# Patient Record
Sex: MALE | Race: BLACK OR AFRICAN AMERICAN | NOT HISPANIC OR LATINO | Employment: FULL TIME | URBAN - METROPOLITAN AREA
[De-identification: names, ages, dates, MRNs, and addresses within clinical notes are randomized per-mention and may not be internally consistent; named-entity substitution may affect disease eponyms.]

---

## 2017-03-29 ENCOUNTER — HOSPITAL ENCOUNTER (EMERGENCY)
Facility: HOSPITAL | Age: 37
Discharge: HOME | End: 2017-03-29
Attending: EMERGENCY MEDICINE

## 2017-03-29 VITALS
OXYGEN SATURATION: 98 % | SYSTOLIC BLOOD PRESSURE: 130 MMHG | DIASTOLIC BLOOD PRESSURE: 70 MMHG | RESPIRATION RATE: 20 BRPM | TEMPERATURE: 98 F | HEART RATE: 74 BPM | HEIGHT: 69 IN | WEIGHT: 245 LBS | BODY MASS INDEX: 36.29 KG/M2

## 2017-03-29 DIAGNOSIS — R05.9 COUGH: ICD-10-CM

## 2017-03-29 DIAGNOSIS — B34.9 VIRAL SYNDROME: ICD-10-CM

## 2017-03-29 DIAGNOSIS — K52.9 ENTERITIS: Primary | ICD-10-CM

## 2017-03-29 LAB
ALBUMIN SERPL BCP-MCNC: 3.7 G/DL
ALP SERPL-CCNC: 99 U/L
ALT SERPL W/O P-5'-P-CCNC: 80 U/L
ANION GAP SERPL CALC-SCNC: 9 MMOL/L
AST SERPL-CCNC: 25 U/L
BACTERIA #/AREA URNS HPF: ABNORMAL /HPF
BASOPHILS # BLD AUTO: 0.04 K/UL
BASOPHILS NFR BLD: 0.3 %
BILIRUB SERPL-MCNC: 0.8 MG/DL
BILIRUB UR QL STRIP: NEGATIVE
BUN SERPL-MCNC: 7 MG/DL
CALCIUM SERPL-MCNC: 9.3 MG/DL
CHLORIDE SERPL-SCNC: 103 MMOL/L
CLARITY UR: CLEAR
CO2 SERPL-SCNC: 25 MMOL/L
COLOR UR: ABNORMAL
CREAT SERPL-MCNC: 0.9 MG/DL
DIFFERENTIAL METHOD: ABNORMAL
EOSINOPHIL # BLD AUTO: 0.1 K/UL
EOSINOPHIL NFR BLD: 0.4 %
ERYTHROCYTE [DISTWIDTH] IN BLOOD BY AUTOMATED COUNT: 13.8 %
EST. GFR  (AFRICAN AMERICAN): >60 ML/MIN/1.73 M^2
EST. GFR  (NON AFRICAN AMERICAN): >60 ML/MIN/1.73 M^2
GLUCOSE SERPL-MCNC: 91 MG/DL
GLUCOSE UR QL STRIP: NEGATIVE
HCT VFR BLD AUTO: 43.1 %
HGB BLD-MCNC: 14.7 G/DL
HGB UR QL STRIP: NEGATIVE
HYALINE CASTS #/AREA URNS LPF: 0 /LPF
KETONES UR QL STRIP: ABNORMAL
LEUKOCYTE ESTERASE UR QL STRIP: NEGATIVE
LIPASE SERPL-CCNC: 4 U/L
LYMPHOCYTES # BLD AUTO: 2.8 K/UL
LYMPHOCYTES NFR BLD: 23.3 %
MCH RBC QN AUTO: 30.3 PG
MCHC RBC AUTO-ENTMCNC: 34.1 %
MCV RBC AUTO: 89 FL
MICROSCOPIC COMMENT: ABNORMAL
MONOCYTES # BLD AUTO: 1 K/UL
MONOCYTES NFR BLD: 8 %
NEUTROPHILS # BLD AUTO: 8 K/UL
NEUTROPHILS NFR BLD: 68 %
NITRITE UR QL STRIP: NEGATIVE
PH UR STRIP: 5 [PH] (ref 5–8)
PLATELET # BLD AUTO: 303 K/UL
PMV BLD AUTO: 10 FL
POTASSIUM SERPL-SCNC: 3.6 MMOL/L
PROT SERPL-MCNC: 8.4 G/DL
PROT UR QL STRIP: ABNORMAL
RBC # BLD AUTO: 4.85 M/UL
RBC #/AREA URNS HPF: 0 /HPF (ref 0–4)
SODIUM SERPL-SCNC: 137 MMOL/L
SP GR UR STRIP: >1.03 (ref 1–1.03)
SQUAMOUS #/AREA URNS HPF: 1 /HPF
URN SPEC COLLECT METH UR: ABNORMAL
UROBILINOGEN UR STRIP-ACNC: ABNORMAL EU/DL
WBC # BLD AUTO: 11.82 K/UL
WBC #/AREA URNS HPF: 0 /HPF (ref 0–5)

## 2017-03-29 PROCEDURE — 25000003 PHARM REV CODE 250: Performed by: EMERGENCY MEDICINE

## 2017-03-29 PROCEDURE — 96375 TX/PRO/DX INJ NEW DRUG ADDON: CPT

## 2017-03-29 PROCEDURE — 81000 URINALYSIS NONAUTO W/SCOPE: CPT

## 2017-03-29 PROCEDURE — 85025 COMPLETE CBC W/AUTO DIFF WBC: CPT

## 2017-03-29 PROCEDURE — 96361 HYDRATE IV INFUSION ADD-ON: CPT

## 2017-03-29 PROCEDURE — 63600175 PHARM REV CODE 636 W HCPCS: Performed by: EMERGENCY MEDICINE

## 2017-03-29 PROCEDURE — 80053 COMPREHEN METABOLIC PANEL: CPT

## 2017-03-29 PROCEDURE — 96374 THER/PROPH/DIAG INJ IV PUSH: CPT

## 2017-03-29 PROCEDURE — 83690 ASSAY OF LIPASE: CPT

## 2017-03-29 PROCEDURE — 99284 EMERGENCY DEPT VISIT MOD MDM: CPT | Mod: 25

## 2017-03-29 RX ORDER — ONDANSETRON 4 MG/1
4 TABLET, FILM COATED ORAL EVERY 8 HOURS PRN
Qty: 12 TABLET | Refills: 0 | Status: SHIPPED | OUTPATIENT
Start: 2017-03-29

## 2017-03-29 RX ORDER — HYDROMORPHONE HYDROCHLORIDE 2 MG/ML
1 INJECTION, SOLUTION INTRAMUSCULAR; INTRAVENOUS; SUBCUTANEOUS
Status: COMPLETED | OUTPATIENT
Start: 2017-03-29 | End: 2017-03-29

## 2017-03-29 RX ORDER — ONDANSETRON 2 MG/ML
4 INJECTION INTRAMUSCULAR; INTRAVENOUS
Status: COMPLETED | OUTPATIENT
Start: 2017-03-29 | End: 2017-03-29

## 2017-03-29 RX ADMIN — SODIUM CHLORIDE 1000 ML: 0.9 INJECTION, SOLUTION INTRAVENOUS at 12:03

## 2017-03-29 RX ADMIN — HYDROMORPHONE HYDROCHLORIDE 1 MG: 2 INJECTION INTRAMUSCULAR; INTRAVENOUS; SUBCUTANEOUS at 12:03

## 2017-03-29 RX ADMIN — ONDANSETRON 4 MG: 2 INJECTION INTRAMUSCULAR; INTRAVENOUS at 12:03

## 2017-03-29 NOTE — ED AVS SNAPSHOT
[facility]  [address] [place] [address]               Berry Fierro   3/29/2017 11:32 AM   ED    Description:  Male : 1980   Department:  [facility]           Your Care was Coordinated By:     Provider Role From To    Juan Antonio Woodall MD Attending Provider 17 1134 --      Reason for Visit     Vomiting           Diagnoses this Visit        Comments    Enteritis    -  Primary     Cough         Viral syndrome           ED Disposition     ED Disposition Condition Comment    Discharge             To Do List           Follow-up Information     Follow up with Sofiya-Rachel Gallagher MD. Schedule an appointment as soon as possible for a visit in 1 week.    Specialty:  Family Medicine    Why:  As needed    Contact information:    [address] [place] [address]  [phone]         These Medications        Disp Refills Start End    ondansetron (ZOFRAN) 4 MG tablet 12 tablet 0 3/29/2017     Take 1 tablet (4 mg total) by mouth every 8 (eight) hours as needed. - Oral      [facility]     [facility] -  Assistance  Registered nurses in the [facility] provide clinical advisement, health education, appointment booking, and other advisory services.  Call for this free service at [phone].             Medications           Message regarding Medications     Verify the changes and/or additions to your medication regime listed below are the same as discussed with your clinician today.  If any of these changes or additions are incorrect, please notify your healthcare provider.        START taking these NEW medications        Refills    ondansetron (ZOFRAN) 4 MG tablet 0    Sig: Take 1 tablet (4 mg total) by mouth every 8 (eight) hours as needed.    Class: Print    Route: Oral      These medications were administered today        Dose Freq    sodium chloride 0.9% bolus 1,000 mL 1,000 mL Once    Sig: Inject 1,000 mLs  "into the vein once.    Class: Normal    Route: Intravenous    hydromorphone (PF) injection 1 mg 1 mg ED 1 Time    Sig: Inject 0.5 mLs (1 mg total) into the vein ED 1 Time.    Class: Normal    Route: Intravenous    ondansetron injection 4 mg 4 mg ED 1 Time    Sig: Inject 4 mg into the vein ED 1 Time.    Class: Normal    Route: Intravenous           Verify that the below list of medications is an accurate representation of the medications you are currently taking.  If none reported, the list may be blank. If incorrect, please contact your healthcare provider. Carry this list with you in case of emergency.           Current Medications     ondansetron (ZOFRAN) 4 MG tablet Take 1 tablet (4 mg total) by mouth every 8 (eight) hours as needed.           Clinical Reference Information           Your Vitals Were     BP Pulse Temp Resp Height Weight    124/72 (BP Location: Right arm, Patient Position: Sitting) 70 98.2 °F (36.8 °C) (Oral) 16 5' 9" (1.753 m) 111.1 kg (245 lb)    SpO2 BMI             97% 36.18 kg/m2         Allergies as of 3/29/2017     No Known Allergies      Immunizations Administered on Date of Encounter - 3/29/2017     None      ED Micro, Lab, POCT     Start Ordered       Status Ordering Provider    03/29/17 1239 03/29/17 1239  Urinalysis  STAT      Final result     03/29/17 1239 03/29/17 1239  Urinalysis Microscopic  Once      Final result     03/29/17 1138 03/29/17 1138  CBC W/ AUTO DIFFERENTIAL  Once      Final result     03/29/17 1138 03/29/17 1138  Comp. Metabolic Panel  STAT      Final result     03/29/17 1138 03/29/17 1138  Lipase  STAT      Final result       ED Imaging Orders     Start Ordered       Status Ordering Provider    03/29/17 1138 03/29/17 1138  X-ray chest PA and lateral  1 time imaging     Comments:  Cough    Final result         Discharge Instructions       VIRAL SYNDROME [Adult]   A viral illness may cause a number of symptoms. The symptoms depend on the part of the body that the virus " "affects. If it settles in the nose/throat/lungs, it may cause cough, sore throat, congestion and sometimes headache. If it settles in the stomach and intestinal tract, it may cause vomiting and diarrhea. Sometimes, it causes vague symptoms like "aching all over," feeling tired, loss of appetite, or fever.   A viral illness usually lasts from 1-2 weeks and sometimes longer. In some cases, a more serious infection can look like a viral syndrome in the first few days of the illness. Repeat exams and further tests are needed to know the difference. Therefore, it is important to watch for the warning signs listed below.   HOME CARE:   If symptoms are severe, rest at home for the first 2-3 days.   Stay away from cigarette smoke (yours or other peoples).   You may use Tylenol (acetaminophen) or ibuprofen (Motrin, Advil) for fever, muscle aching and headache, unless another medicine was prescribed for this. [ NOTE : If you have chronic liver or kidney disease or ever had a stomach ulcer or GI bleeding, talk with your doctor before using these medicines.] (Aspirin should never be used in anyone under 18 years of age who is ill with a fever. It may cause severe liver damage.)   Your appetite may be poor, so a light diet is fine. Avoid dehydration by drinking 8-12 eight-ounce glasses of fluids per day. This may include water, orange juice and lemonade, apple, grape and cranberry juice, clear fruit drinks, electrolyte replacement and sports drinks, decaffeinated teas and coffee.   Over-the-counter remedies will not shorten the length of the illness but may be helpful for the following symptoms: cough (Robitussin DM); sore throat (Chloraseptic lozenges or spray); nasal and sinus congestion (Actifed or Sudafed). [NOTE: Do not use decongestants if you have high blood pressure.].  FOLLOW UP with your doctor as advised if you do not improve over the next week.   GET PROMPT MEDICAL ATTENTION if any of the following occur:   Cough " with lots of colored sputum (mucus) or blood in your sputum   Chest pain, shortness of breath, wheezing or difficulty breathing   Severe headache; face, neck or ear pain   Severe constant right-sided lower abdominal pain   Continued vomiting (cant keep liquids down)   Frequent diarrhea (more than 5 times a day); blood (red or black color) or mucus in diarrhea   Feeling weak, dizzy, or like you are going to faint   Extreme thirst   Fever of 100.4°F (38°C) oral or higher, not better with fever medication   [employer]  © 8883-4847 [employer] [facility], [address] [place] [address], [place] [address]. All rights reserved. This information is not intended as a substitute for professional medical care. Always follow your healthcare professional's instructions.      Smoking Cessation     If you would like to quit smoking:   You may be eligible for free services if you are a [place] resident and started smoking cigarettes before September 1, 1988.  Call the [employer]oking [employer]essation [employer] ([employer]) toll free at (372) 656-9592 or (983) 886-3806.   Call 1-800-QUIT-NOW if you do not meet the above criteria.             [facility] complies with applicable Federal civil rights laws and does not discriminate on the basis of race, color, national origin, age, disability, or sex.        Language Assistance Services     ATTENTION: Language assistance services are available, free of charge. Please call [phone].      ATENCIÓN: Si habla español, tiene a rivas disposición servicios gratuitos de asistencia lingüística. Llame al [phone].     CHÚ Ý: N?u b?n nói Ti?ng Vi?t, có các d?ch v? h? tr? ngôn ng? mi?n phí dành cho b?n. G?i s? [phone].

## 2017-03-29 NOTE — DISCHARGE INSTRUCTIONS
"VIRAL SYNDROME [Adult]   A viral illness may cause a number of symptoms. The symptoms depend on the part of the body that the virus affects. If it settles in the nose/throat/lungs, it may cause cough, sore throat, congestion and sometimes headache. If it settles in the stomach and intestinal tract, it may cause vomiting and diarrhea. Sometimes, it causes vague symptoms like "aching all over," feeling tired, loss of appetite, or fever.   A viral illness usually lasts from 1-2 weeks and sometimes longer. In some cases, a more serious infection can look like a viral syndrome in the first few days of the illness. Repeat exams and further tests are needed to know the difference. Therefore, it is important to watch for the warning signs listed below.   HOME CARE:   If symptoms are severe, rest at home for the first 2-3 days.   Stay away from cigarette smoke (yours or other peoples).   You may use Tylenol (acetaminophen) or ibuprofen (Motrin, Advil) for fever, muscle aching and headache, unless another medicine was prescribed for this. [ NOTE : If you have chronic liver or kidney disease or ever had a stomach ulcer or GI bleeding, talk with your doctor before using these medicines.] (Aspirin should never be used in anyone under 18 years of age who is ill with a fever. It may cause severe liver damage.)   Your appetite may be poor, so a light diet is fine. Avoid dehydration by drinking 8-12 eight-ounce glasses of fluids per day. This may include water, orange juice and lemonade, apple, grape and cranberry juice, clear fruit drinks, electrolyte replacement and sports drinks, decaffeinated teas and coffee.   Over-the-counter remedies will not shorten the length of the illness but may be helpful for the following symptoms: cough (Robitussin DM); sore throat (Chloraseptic lozenges or spray); nasal and sinus congestion (Actifed or Sudafed). [NOTE: Do not use decongestants if you have high blood pressure.].  FOLLOW UP with your " doctor as advised if you do not improve over the next week.   GET PROMPT MEDICAL ATTENTION if any of the following occur:   Cough with lots of colored sputum (mucus) or blood in your sputum   Chest pain, shortness of breath, wheezing or difficulty breathing   Severe headache; face, neck or ear pain   Severe constant right-sided lower abdominal pain   Continued vomiting (cant keep liquids down)   Frequent diarrhea (more than 5 times a day); blood (red or black color) or mucus in diarrhea   Feeling weak, dizzy, or like you are going to faint   Extreme thirst   Fever of 100.4°F (38°C) oral or higher, not better with fever medication   [employer]  © 1137-0343 [facility], [address] [place] [address], [place] [address]. All rights reserved. This information is not intended as a substitute for professional medical care. Always follow your healthcare professional's instructions.

## 2017-08-04 NOTE — ED PROVIDER NOTES
"Encounter Date: 3/29/2017    SCRIBE #1 NOTE: I, Fiona Amy , am scribing for, and in the presence of,  Juan Antonio Woodall MD. I have scribed the following portions of the note - Other sections scribed: HPI and ROS .       History     Chief Complaint   Patient presents with    Vomiting     Pt. presents with n/v and "hot flashes and chills that began yesterday". "I can't hold nothing down".      Review of patient's allergies indicates:  No Known Allergies  HPI Comments: CC: Emesis     HPI: This 35 y/o male with no pertinent PMHx presents to the ED for evaluation of acute onset emesis with nausea, chills, hot flashes and right-sided headed that began yesterday. Pt states he has been unable to keep food or liquids down. No prior attempted treatment or alleviating factors. No allergies or medical problems.     The history is provided by the patient. No  was used.     History reviewed. No pertinent past medical history.  History reviewed. No pertinent surgical history.  History reviewed. No pertinent family history.  Social History   Substance Use Topics    Smoking status: Current Every Day Smoker    Smokeless tobacco: None    Alcohol use No     Review of Systems   Constitutional: Positive for chills. Negative for fever.   HENT: Negative for sore throat.    Respiratory: Negative for shortness of breath.    Cardiovascular: Negative for chest pain.   Gastrointestinal: Positive for nausea and vomiting.   Genitourinary: Negative for dysuria.   Musculoskeletal: Negative for myalgias.   Skin: Negative for rash.   Neurological: Positive for headaches (Right-sided).   Psychiatric/Behavioral: Negative for behavioral problems and confusion.       Physical Exam   Initial Vitals   BP Pulse Resp Temp SpO2   03/29/17 1109 03/29/17 1109 03/29/17 1109 03/29/17 1109 03/29/17 1109   124/72 70 16 98.2 °F (36.8 °C) 97 %     Physical Exam    Nursing note and vitals reviewed.  Constitutional: He appears well-developed and " well-nourished.   HENT:   Head: Normocephalic and atraumatic.   Eyes: EOM are normal. Pupils are equal, round, and reactive to light.   Cardiovascular: Normal rate, regular rhythm, normal heart sounds and intact distal pulses.   Pulmonary/Chest: Breath sounds normal. No respiratory distress. He has no wheezes. He has no rhonchi. He has no rales. He exhibits no tenderness.   Abdominal: Soft. Bowel sounds are normal. He exhibits no distension. There is no tenderness.   Musculoskeletal: Normal range of motion. He exhibits no edema.   Neurological: He is alert and oriented to person, place, and time.   Skin: Skin is warm and dry.         ED Course   Procedures  Labs Reviewed   COMPREHENSIVE METABOLIC PANEL - Abnormal; Notable for the following:        Result Value    ALT 80 (*)     All other components within normal limits   URINALYSIS - Abnormal; Notable for the following:     Specific Gravity, UA >1.030 (*)     Protein, UA 2+ (*)     Ketones, UA Trace (*)     Urobilinogen, UA 4.0-6.0 (*)     All other components within normal limits   URINALYSIS MICROSCOPIC - Abnormal; Notable for the following:     Bacteria, UA Few (*)     All other components within normal limits   CBC W/ AUTO DIFFERENTIAL   LIPASE            MEDICAL DECISION MAKING    This is an emergent evaluation of the patient's symptoms.  Differential diagnoses includes: Enteritis, pneumonia, pancreatitis, dehydration.  Room air pulse oximetry interpreted by me as normal. A decision was made to obtain the patient's medical records.  Records were not immediately available for review.  No leukocytosis or evidence of anemia.  Unremarkable chest x-ray.  Unremarkable metabolic panel.  Suspect acute viral illness.  Discharge with symptomatically treatment and outpatient follow-up.             Scribe Attestation:   Scribe #1: I performed the above scribed service and the documentation accurately describes the services I performed. I attest to the accuracy of the  note.    Attending Attestation:           Physician Attestation for Scribe:  Physician Attestation Statement for Scribe #1: I, Juan Antonio Woodall MD, reviewed documentation, as scribed by Fiona Reyes  in my presence, and it is both accurate and complete.                 ED Course     Clinical Impression:   The primary encounter diagnosis was Enteritis. Diagnoses of Cough and Viral syndrome were also pertinent to this visit.          Juan Antonio Woodall MD  03/29/17 0848     Statement Selected Statement Selected Statement Selected Statement Selected